# Patient Record
Sex: FEMALE | Race: WHITE
[De-identification: names, ages, dates, MRNs, and addresses within clinical notes are randomized per-mention and may not be internally consistent; named-entity substitution may affect disease eponyms.]

---

## 2018-02-19 ENCOUNTER — HOSPITAL ENCOUNTER (OUTPATIENT)
Dept: HOSPITAL 47 - RADMRIMAIN | Age: 38
Discharge: HOME | End: 2018-02-19
Payer: COMMERCIAL

## 2018-02-19 DIAGNOSIS — Z88.0: ICD-10-CM

## 2018-02-19 DIAGNOSIS — M50.222: Primary | ICD-10-CM

## 2018-02-19 DIAGNOSIS — R42: ICD-10-CM

## 2018-02-19 DIAGNOSIS — H53.2: ICD-10-CM

## 2018-02-19 PROCEDURE — 72141 MRI NECK SPINE W/O DYE: CPT

## 2018-02-19 PROCEDURE — 70551 MRI BRAIN STEM W/O DYE: CPT

## 2018-02-19 NOTE — MR
EXAMINATION TYPE: MR brain wo

 

DATE OF EXAM: 2/19/2018

 

COMPARISON: NONE

 

HISTORY: Dizzy with hearing loss, numbness and weakness left side x26 years

 

T1-weighted sagittal, T2, FLAIR, and diffusion axial, and T2 coronal coronal views of the brain are s
ubmitted.  

 

There is no evidence of acute ischemia.  The ventricles, basal cisterns, and sulci overlying the conv
exities are consistent with the patient's age.  There is no mass effect.  

 

Craniocervical junction maintained. Sella turcica has a normal appearance.

 

No cerebellopontine angle mass. Changes of mild chronic sinusitis. Nasal septal deviation noted.

 

WHITE MATTER:

No abnormal signal the visualized white matter.

 

IMPRESSION:

1. No acute intracranial process

 

 

 

 

EXAMINATION TYPE: MR cervical spine wo

 

DATE OF EXAM: 2/19/2018

 

COMPARISON: NONE

 

HISTORY: Dizzy with hearing loss, numbness and weakness left side x26 years

 

TECHNIQUE: T1 sagittal and coronal, T2 sagittal, and gradient echo axial views of the cervical spine 
are submitted.

 

FINDINGS: The cranial cervical junction is preserved.  There is no abnormal signal seen within the sp
inal cord or paraspinal soft tissues.

 

At C2-3 there is no degenerative disc disease, disc herniation, canal stenosis or foraminal encroachm
ent.

 

At C3-4 there is no degenerative disc disease, disc herniation, canal stenosis or foraminal encroachm
ent.

 

At C4-5 there is no degenerative disc disease, disc herniation, canal stenosis or foraminal encroachm
ent.

 

At C5-6 there is mild disc desiccation and minimal central disc bulging but no canal stenosis or fora
ibis encroachment. Very mild uncovertebral joint hypertrophy

 

At C6-7 there is no degenerative disc disease. Minimal central disc bulging but no foraminal encroach
ment or canal stenosis.

 

At C7-T1 there is no degenerative disc disease, disc herniation, canal stenosis or foraminal encroach
ment.

 

 

IMPRESSION: 

1.  Mild central disc bulging at C5-6 and C6-C7 with no canal stenosis or foraminal encroachment.

## 2018-02-22 ENCOUNTER — HOSPITAL ENCOUNTER (OUTPATIENT)
Dept: HOSPITAL 47 - RADMRIMAIN | Age: 38
Discharge: HOME | End: 2018-02-22
Payer: COMMERCIAL

## 2018-02-22 DIAGNOSIS — M51.36: ICD-10-CM

## 2018-02-22 DIAGNOSIS — M46.84: ICD-10-CM

## 2018-02-22 DIAGNOSIS — M46.86: ICD-10-CM

## 2018-02-22 DIAGNOSIS — M51.34: Primary | ICD-10-CM

## 2018-02-22 DIAGNOSIS — Z88.0: ICD-10-CM

## 2018-02-22 PROCEDURE — 72148 MRI LUMBAR SPINE W/O DYE: CPT

## 2018-02-22 PROCEDURE — 72146 MRI CHEST SPINE W/O DYE: CPT

## 2018-02-22 NOTE — MR
EXAMINATION TYPE: MR tspine/lspine wo con

 

DATE OF EXAM: 2/22/2018

 

COMPARISON: Previous lumbar MRI dated 1/9/2017

 

HISTORY: Back pain, tingling/numbness

 

TECHNIQUE: Multiplanar, multisequence imaging of the thoracic and lumbar spine is performed without I
V contrast.

 

FINDINGS: 

 

Thoracic MRI:

 

There is a spinal curvature. Thoracic vertebral bodies show preserved height, alignment, there is mil
d multilevel spondylosis present with minimal endplate discogenic marrow signal change. No significan
t spinal stenosis, foraminal encroachment. There is multilevel loss of disc height and signal at the 
intervertebral levels compatible with disc desiccation and degenerative disc disease. Thoracic cord s
ignal is maintained.

 

T4-5 shows a right posterior paracentral disc bulge causing minimal anterolateral mass effect on the 
thecal sac.

 

T5-6 shows similar right posterior paracentral disc bulge causing minimal anterolateral mass effect o
n the thecal sac.

 

T6-7 shows a left posterior paracentral disc herniation possibly contacting the anterior thoracic cor
d.

 

T7-8: Right posterior paracentral disc herniation may contact the anterior thoracic cord.

 

T9-10: Left posterior paracentral disc herniation may contact the anterior thoracic cord.

 

T10-11: Posterior central disc herniation is small and may contact the anterior thoracic cord.

 

T11-12: There is facet arthropathy with hypertrophy of ligamentum flavum causing some encroachment on
 the lateral recesses.

 

Remaining levels are unremarkable.

 

IMPRESSION: Multilevel thoracic degenerative disc disease, facet arthropathy. No significant spinal s
tenosis or foraminal encroachment.

 

 

 

Lumbar spine MRI: 

 

There is not a significant interval change. Lumbar vertebral bodies show preserved height and alignme
nt. Some mild loss of disc height signal again noted at L3-4, loss of disc height signal has progress
ed slightly in the interval at L4-5 compatible with disc desiccation and degenerative disc disease. T
he conus is at T12-L1 shows an unremarkable appearance. No significant spinal stenosis.

 

L5-S1: No evident disc herniation.

 

L4-5: Broad-based posterior disc bulge contacts anterior thecal sac. Facet arthropathy with hypertrop
hy ligamentum flavum encroaches mildly on the lateral recesses, circumferential extension of endplate
 disc complex encroaches somewhat on the neural foramina greater on the right.

 

L4-5: Posterior broad-based disc bulge causes mild anterior mass effect on the thecal sac. There is s
ome facet arthropathy encroaching on the lateral recesses. No significant foraminal encroachment.

 

L2-3: Within normal limits

 

L1-2: Unremarkable

 

impression: Degenerative disc disease and facet arthropathy as described. Findings are similar to tahir
or exam.

## 2018-04-24 ENCOUNTER — HOSPITAL ENCOUNTER (OUTPATIENT)
Dept: HOSPITAL 47 - SLEEP | Age: 38
Discharge: HOME | End: 2018-04-24
Payer: COMMERCIAL

## 2018-04-24 DIAGNOSIS — M19.90: ICD-10-CM

## 2018-04-24 DIAGNOSIS — G47.09: Primary | ICD-10-CM

## 2018-04-24 DIAGNOSIS — G89.29: ICD-10-CM

## 2018-04-24 DIAGNOSIS — M51.35: ICD-10-CM

## 2018-04-24 DIAGNOSIS — F41.9: ICD-10-CM

## 2018-04-24 DIAGNOSIS — N80.9: ICD-10-CM

## 2018-04-24 DIAGNOSIS — Z79.1: ICD-10-CM

## 2018-04-24 DIAGNOSIS — M50.30: ICD-10-CM

## 2018-04-24 DIAGNOSIS — G62.9: ICD-10-CM

## 2018-04-24 PROCEDURE — 99211 OFF/OP EST MAY X REQ PHY/QHP: CPT

## 2018-04-24 NOTE — CONS
CONSULTATION



REASON FOR CONSULTATION:

A consultation for insomnia.



37-year-old female patient who has been having excessive fatigue and sleepiness during

the day.  However she has chronic problems with insomnia which has gotten worse

significant over the past few years.  She has multiple medical problems and

comorbidities.  She has history of cervical thoracic and lumbar degenerative disc

disease in addition to severe osteoarthritis, peripheral neuropathy and previous

history of endometriosis.  She also gives history of anxiety and depression.  She is

having constant hot flashes.  She is restless at night when she sleeps and sometimes

she gets up in the middle of night _____her partner and on the mattress.  She flips

blankets on and off all night.  She has difficulties in sleep initiation and sleep

maintenance.  She feels tired all the time.  Sometimes takes more than 1-2 hours to

fall asleep.  In general she is going to bed between 9-10 p.m. and she gets up between

330-5 a.m. in the morning.  She thinks she is averaging less than 4 hours of sleep and

she takes on and off naps during the day.  She has been on heavy doses of narcotics in

the past.  As mentioned earlier, the patient used to be on a combination of morphine

and Percocet and with management that she is getting through Dr. El and pain

shots.  The patient has been switched to Norco 7.5 mg t.i.d.  Valium is being utilized

as a muscle relaxant.  Dose of 5 mg p.o. t.i.d.  She takes ibuprofen 800 mg b.i.d. and

Naprosyn also has been in her medication list.  She takes Tylenol for pain and she is

also on canniboid tablets 20 mg p.o. t.i.d. and she has been doing this for quite some

time.  No snoring.  No witnessed apneas.  No chronic hypersomnia or sleepiness.

Restless at night as mentioned, along with symptoms of peripheral neuropathy for which

she is on Lyrica 50 mg p.o. b.i.d.  She has severe side effects _____ and this was

discontinued.  She is always anxious and she is always panicky and she has a component

of depression and she has not been treated for any symptoms of depression for now.  She

does not follow up on a regular basis with Psychiatry.  Pain obviously is an issue and

her pain is still around 6 to 7/10 in severity and this has been another factor that

will wake her up in the middle of the night constantly and the patient cannot

confidently situate  herself in bed.  There is no bipolar disorder.  No reported PTSD.

No vivid dreams.  No nightmares.  No history of any posttraumatic stress disorder.

None of the medications are stimulating.  No nocturnal symptoms of congestion heart

failure, COPD or asthma.  No heartburn. No frequent urinations.  No diabetes mellitus.

No history of any CVA or any other neurologic disorders.  No seizure activity.



PAST MEDICAL HISTORY:

Past medical history is:

1. Peripheral neuropathy.

2. Chronic pain related to cervical thoracic and lumbar degenerative disc disease.

3. Menopause.

4. Degenerative arthritis.

5. Endometriosis.

6. Anxiety and depression.



SURGICAL HISTORY:

Includes D and C, , ovarian surgery and oophorectomy in , and cyst removal from

the ovary in  and ,  , hysterectomy in .



ALLERGIES:

THE ALLERGIES TO GABAPENTIN AND PREDNISONE.



OUTPATIENT MEDICATION LIST:

Includes Naprosyn 500 mg p.o. twice a day, estradiol 1 mg 1 tablet a day.  Valium 5 mg

p.o. t.i.d. Norco 7.5/500 t.i.d., Lyrica 50 mg b.i.d., ibuprofen 800 mg b.i.d. Tylenol

p.r.n. basis and Canniboid CBD tablets 20 mg p.o. t.i.d.



SOCIAL HISTORY:

Smokes. No history of alcohol, no history of IV drugs.  No history of substance abuse.



FAMILY HISTORY:

Negative for sleep breathing disorder.



REVIEW OF SYSTEMS:

12-point review of system was done.  She is excessively fatigued.  She has chronic

insomnia.  She has restlessness in the legs.  Some occasional combative behavior at

nighttime yet this seems to be more so of a grabbing her partner and without inflicting

any major injuries.  She is anxious.  She has no claustrophobia.  No problems with

palpitation, heartburn,  or shortness of breath at night time.



PHYSICAL EXAMINATION:

BP is 142/72, pulse 92, respirations 16, temperature 98.7 saturation 99% on room air.

Weight is 196.  Height 5 feet 5 inches, neck size is 14-1/4 of an inch and BMI 32.6.

GENERAL APPEARANCE:  Calm, comfortable in no acute distress. Head is atraumatic,

normocephalic.

NECK:  Supple.  There is no JVD.  No goiter or neck masses.  She has a pierced tongue.

LUNGS:  Clear to auscultation.

HEART:  Sounds are regular rate and rhythm.  Normal S1, S2.  No S3.  No murmurs.

ABDOMEN:  Soft, nontender.  No organomegaly.

EXTREMITIES:  No edema.  No cyanosis or clubbing.  Neurologic:  The patient has

peripheral neuropathy.  No facial asymmetry.  No focal neurological deficits.

PSYCHIATRIC:  Positive for anxiety and depression.

SKIN:  Negative for any wounds or ulceration.



IMPRESSION:

1. Comorbid insomnia.  This patient has difficulty in sleep initiation and maintenance

    and she wakes up too early and her sleep is nonrestorative in itself and poor

    quality.  She has multiple factors affecting her sleep quality in general.  She has

    obvious behavioral factor that needs to be worked up including the issues related

    to sleep hygiene.  At the same time there are other medical and psychiatric

    factors.  I would say medical and psychiatric factors.  Another factor is his is

    her chronic pain.

2. Went over the case at length.  In terms of chronic pain, the patient is being

    followed up by Dr. El and currently she is on a combination of Norco and

    Valium as a muscle relaxant.  Valium may also help this patient with chronic

    anxiety.  She states that she has done better with Percocet and Norco sometimes

    that wires up in the middle of the night and makes her more awake.  As such, a

    switched from Brantley to Percocet may be a reasonable option.

3. In terms of psychiatric disorders, the patient obviously may have an underlying

    component of anxiety and depression contributing to her problem and psychotic

    diagnosis in general is present around 40% in patient's with insomnia.  As such,

    treating that with a sedating antidepressant may be effective and addition of

    trazodone is reasonable.

4. She is on nonsteroidal antiinflammatory medication for pain that will be continued.

    She is on Lyrica for neuropathy that will be continued.



PLAN:

1. _____100 p.o. daily.

2. Refer back to Dr. El and consideration for switching the patient from Brantley to

    Percocet.

3. Adequate pain control.

4. Continual nonsteroidal inflammatory medication.

5. Continue Lyrica.

6. Extensive education about sleep hygiene measures as the patient has some behavioral

    measures that can be corrected to improve her sleep quality and insomnia in

    general.

7. We will continue to follow.

8. Doubt obstructive sleep apnea at this point.





MMODL / IJN: [id number] / Job#: [id number]